# Patient Record
Sex: MALE | Race: WHITE | NOT HISPANIC OR LATINO | Employment: FULL TIME | URBAN - NONMETROPOLITAN AREA
[De-identification: names, ages, dates, MRNs, and addresses within clinical notes are randomized per-mention and may not be internally consistent; named-entity substitution may affect disease eponyms.]

---

## 2017-05-03 ENCOUNTER — OFFICE VISIT - GICH (OUTPATIENT)
Dept: FAMILY MEDICINE | Facility: OTHER | Age: 32
End: 2017-05-03

## 2017-05-03 ENCOUNTER — HISTORY (OUTPATIENT)
Dept: FAMILY MEDICINE | Facility: OTHER | Age: 32
End: 2017-05-03

## 2017-05-03 DIAGNOSIS — J06.9 ACUTE UPPER RESPIRATORY INFECTION: ICD-10-CM

## 2017-05-03 DIAGNOSIS — B97.89 OTHER VIRAL AGENTS AS THE CAUSE OF DISEASES CLASSIFIED ELSEWHERE: ICD-10-CM

## 2017-05-03 DIAGNOSIS — J02.9 ACUTE PHARYNGITIS: ICD-10-CM

## 2017-05-03 LAB — STREP A ANTIGEN - HISTORICAL: NEGATIVE

## 2017-05-26 ENCOUNTER — COMMUNICATION - GICH (OUTPATIENT)
Dept: FAMILY MEDICINE | Facility: OTHER | Age: 32
End: 2017-05-26

## 2017-05-26 DIAGNOSIS — J30.1 ALLERGIC RHINITIS DUE TO POLLEN: ICD-10-CM

## 2018-01-04 NOTE — PATIENT INSTRUCTIONS
Patient Information     Patient Name MRN Bennie Morris [id number] Male 1985      Patient Instructions by Daphne Mccoy NP at 5/3/2017  4:15 PM     Author:  Dahpne Mccoy NP Service:  (none) Author Type:  PHYS- Nurse Practitioner     Filed:  5/3/2017  5:10 PM Encounter Date:  5/3/2017 Status:  Signed     :  Daphne Mccoy NP (PHYS- Nurse Practitioner)            Cold Medicines   What are cold medicines?  Symptoms of the common cold start gradually over several days and usually last about two weeks. Symptoms may include sneezing, a stuffy or runny nose, sore throat, cough, watery eyes, mild headache, or body aches. A cold will go away on its own without treatment. However, there are many nonprescription products that may help relieve some of the symptoms of a cold. Cold medicines often contain more than one ingredient and are used to treat more than one symptom. Read the labels and buy products that have only the ingredients that you need. If you are not sure which medicine is best, ask your pharmacist.  How do they work?  Decongestants reduce swelling in your nose and sinuses. They may also lessen the amount of mucus made by your nose. If you use decongestants more often than directed, your stuffy nose may get worse.   Antihistamines block the effect of histamine. Histamine is a chemical your body makes when you have an allergic reaction. Antihistamines are most often used to treat itchy or watery eyes or a stuffy or runny nose caused by an allergy. Antihistamines may not help a stuffy or runny nose caused by a cold because they can make mucus thick and dry.  Mucolytics are medicines that make mucus thinner so that it is easier to cough up out of your throat and lungs.  Expectorants are cough medicines that may help to keep the mucus thin and bring up mucus from the lungs when you cough. This may relieve chest congestion and make it easier to breathe.   Cough suppressants  (antitussives) are medicines that lessen the urge to cough. They may give relief from a dry, hacking cough. If you have a cough that is wet sounding and produces mucus, it is important for you to cough the mucus up out of your lungs. For this reason, cough suppressants are not recommended for a wet sounding cough.  Fever and pain relievers, such as acetaminophen, aspirin, or other nonsteroidal anti-inflammatory drugs (NSAIDs), are often included in cold medicine. Read labels carefully to avoid taking more medicine than you need.  What else do I need to know about this medicine?    Talk to your healthcare provider if your symptoms start suddenly or you have severe symptoms. This may mean you have something more serious than a cold.    Follow the directions that come with your medicine, including information about food or alcohol. Make sure you know how and when to take your medicine. Do not take more or less than you are supposed to take.    Try to get all of your medicine at the same place. Your pharmacist can help make sure that all of your medicines are safe to take together.    Keep a list of your medicines with you. List all of the prescription medicines, nonprescription medicines, supplements, natural remedies, and vitamins that you take. Tell all healthcare providers who treat you about all of the products you are taking.    Many medicines have side effects. A side effect is a symptom or problem that is caused by the medicine. Ask your healthcare provider or pharmacist what side effects the medicine may cause and what you should do if you have side effects.    Nonsteroidal anti-inflammatory medicines (NSAIDs), such as ibuprofen, naproxen, and aspirin, may cause stomach bleeding and other problems. These risks increase with age. Read the label and take as directed. Unless recommended by your healthcare provider, do not take for more than 10 days for any reason.    Acetaminophen may cause liver damage or other  problems. Unless recommended by your provider, don't take more than 3000 milligrams (mg) in 24 hours. To make sure you don t take too much, check other medicines you take to see if they also contain acetaminophen. Ask your provider if you need to avoid drinking alcohol while taking this medicine.  If you have any questions, ask your healthcare provider or pharmacist for more information. Be sure to keep all appointments for provider visits or tests.      Symptoms likely due to virus. No antibiotic is needed at this time. Symptoms typically worse on days 2-5 and then stabilize and you are sick for days 5-12. Days 12-14 there is slow resolution and if there is a cough, studies show it can linger longer, however one is not as ill as in the beginning. If symptoms begin worsening or fail to improve after 14 days, return to clinic for reevaluation.

## 2018-01-04 NOTE — PROGRESS NOTES
Patient Information     Patient Name MRN Sex Bennie Rubio [id number] Male 1985      Progress Notes by Daphne Mccoy NP at 5/3/2017  4:15 PM     Author:  Daphne Mccoy NP Service:  (none) Author Type:  PHYS- Nurse Practitioner     Filed:  5/3/2017  5:29 PM Encounter Date:  5/3/2017 Status:  Signed     :  Daphne Mccoy NP (PHYS- Nurse Practitioner)            Nursing Notes:   Elle Nguyen  5/3/2017  5:08 PM  Signed  Patient presents to clinic with cough, right ear pain.  Elle NguyenLPN ....................  5/3/2017   4:45 PM      SUBJECTIVE:    Bennie Sullivan is a 32 y.o. male who presents for Cough and ear pain    URI    This is a new problem. Episode onset: 5 days. Maximum temperature: 99.0-99.9. The fever has been present for 1 to 2 days. Associated symptoms include congestion, coughing, ear pain, headaches, a plugged ear sensation, rhinorrhea, sneezing and a sore throat. Pertinent negatives include no sinus pain, swollen glands, vomiting or wheezing. He has tried decongestant for the symptoms. The treatment provided mild relief.       Current Outpatient Prescriptions on File Prior to Visit       Medication  Sig Dispense Refill     cetirizine (ZYRTEC) 10 mg tablet Take 1 tablet by mouth once daily. 30 tablet 11     dextroamphetamine-amphetamine (ADDERALL XR) 30 mg Extended-Release capsule Take 1 capsule by mouth once daily  Earliest Fill Date: 17 TAKE ONE CAPSULE BY MOUTH EVERY MORNING 30 capsule 0     triamcinolone, 55 mcg each actuation, nasal (NASACORT) 55 mcg nasal spray Inhale 2 Sprays into both nostrils once daily. 16.5 g 0     No current facility-administered medications on file prior to visit.        REVIEW OF SYSTEMS:  Review of Systems   HENT: Positive for congestion, ear pain, rhinorrhea, sneezing and sore throat.    Respiratory: Positive for cough. Negative for wheezing.    Gastrointestinal: Negative for vomiting.   Neurological: Positive for  "headaches.       OBJECTIVE:  /78  Pulse 64  Temp 99.8  F (37.7  C)  Ht 1.676 m (5' 6\")  Wt 78.8 kg (173 lb 12.8 oz)  BMI 28.05 kg/m2    EXAM:   Physical Exam   Constitutional: He is well-developed, well-nourished, and in no distress.   HENT:   Head: Normocephalic and atraumatic.   Right Ear: Ear canal normal. Tympanic membrane is injected. A middle ear effusion is present.   Left Ear: Ear canal normal. Tympanic membrane is injected. A middle ear effusion is present.   Nose: Rhinorrhea present. Right sinus exhibits no maxillary sinus tenderness and no frontal sinus tenderness. Left sinus exhibits no maxillary sinus tenderness and no frontal sinus tenderness.   Nasal congestion heard. Ears: Mild bilateral effusion seen, TMs injected, not bulging, clear fluid    Eyes: Conjunctivae are normal.   Neck: Neck supple.   Cardiovascular: Normal rate, regular rhythm and normal heart sounds.    Pulmonary/Chest: Effort normal and breath sounds normal. No respiratory distress. He has no wheezes. He has no rales.   Lymphadenopathy:     He has no cervical adenopathy.   Nursing note and vitals reviewed.    Results for orders placed or performed in visit on 05/03/17      RAPID STREP WITH REFLEX CULTURE      Result  Value Ref Range    STREP A ANTIGEN           Negative Negative       ASSESSMENT/PLAN:    ICD-10-CM    1. Sore throat J02.9 RAPID STREP WITH REFLEX CULTURE      RAPID STREP WITH REFLEX CULTURE   2. Viral upper respiratory tract infection J06.9      B97.89         Plan:  Completed labs at today's visit RST.  I personally reviewed the labs with the patient/parent at the visit. Abnormalities include None. Symptoms likely due to virus. No antibiotic is needed at this time. Symptoms typically worse on days 2-5 and then stabilize and you are sick for days 5-12. Days 12-14 there is slow resolution and if there is a cough, studies show it can linger longer, however one is not as ill as in the beginning. If symptoms begin " worsening or fail to improve after 14 days, return to clinic for reevaluation. All questions were answered and he is in agreement with plan.        KAYLYNN MACIAS NP ....................  5/3/2017   5:29 PM

## 2018-01-04 NOTE — NURSING NOTE
Patient Information     Patient Name MRN Sex Bennie Rubio [id number] Male 1985      Nursing Note by Elle Nguyen at 5/3/2017  4:15 PM     Author:  Elle Nguyen Service:  (none) Author Type:  (none)     Filed:  5/3/2017  5:08 PM Encounter Date:  5/3/2017 Status:  Signed     :  Elle Nguyen            Patient presents to clinic with cough, right ear pain.  Elle Anthony ....................  5/3/2017   4:45 PM

## 2018-01-05 NOTE — TELEPHONE ENCOUNTER
Patient Information     Patient Name MRN Sex Bennie Rubio [id number] Male 1985      Telephone Encounter by Fol Tovar RN at 2017 10:57 AM     Author:  Flo Tovar RN Service:  (none) Author Type:  NURS- Registered Nurse     Filed:  2017 11:01 AM Encounter Date:  2017 Status:  Signed     :  Flo Tovar RN (NURS- Registered Nurse)            Antihistamines:    Office visit in the past 12 months.    Last visit with JULIUS YATES was on: 2016 in [facility]  Next visit with JULIUS YATES is on: No future appointment listed with this provider  Next visit with Family Practice is on: No future appointment listed in this department    Max refills 12 months from last office visit.    Chart review shows that last office visit with PCP to address diagnosis of allergic rhinitis was on 16. Patient is due for annual medication management appointment with PCP. Will refill rx for a limited supply as well as send patient a reminder letter/MyChart message.    Prescription refilled per RN Medication Refill Policy.................... Flo Tovar RN ....................  2017   10:58 AM

## 2018-01-17 RX ORDER — CETIRIZINE HYDROCHLORIDE 10 MG/1
10 TABLET ORAL DAILY
COMMUNITY
Start: 2017-05-26 | End: 2018-03-23

## 2018-01-17 RX ORDER — DEXTROAMPHETAMINE SACCHARATE, AMPHETAMINE ASPARTATE MONOHYDRATE, DEXTROAMPHETAMINE SULFATE AND AMPHETAMINE SULFATE 7.5; 7.5; 7.5; 7.5 MG/1; MG/1; MG/1; MG/1
30 CAPSULE, EXTENDED RELEASE ORAL EVERY MORNING
COMMUNITY
Start: 2017-01-30

## 2018-01-17 RX ORDER — TRIAMCINOLONE ACETONIDE 55 UG/1
2 SPRAY, METERED NASAL DAILY
COMMUNITY
Start: 2016-05-06 | End: 2018-03-23

## 2018-01-25 VITALS
SYSTOLIC BLOOD PRESSURE: 128 MMHG | BODY MASS INDEX: 27.93 KG/M2 | TEMPERATURE: 99.8 F | HEART RATE: 64 BPM | DIASTOLIC BLOOD PRESSURE: 78 MMHG | WEIGHT: 173.8 LBS | HEIGHT: 66 IN

## 2018-03-23 ENCOUNTER — OFFICE VISIT (OUTPATIENT)
Dept: FAMILY MEDICINE | Facility: OTHER | Age: 33
End: 2018-03-23
Attending: FAMILY MEDICINE
Payer: COMMERCIAL

## 2018-03-23 VITALS
RESPIRATION RATE: 16 BRPM | HEIGHT: 67 IN | SYSTOLIC BLOOD PRESSURE: 118 MMHG | WEIGHT: 184.2 LBS | DIASTOLIC BLOOD PRESSURE: 70 MMHG | BODY MASS INDEX: 28.91 KG/M2

## 2018-03-23 DIAGNOSIS — J30.89 CHRONIC NONSEASONAL ALLERGIC RHINITIS DUE TO POLLEN: Primary | ICD-10-CM

## 2018-03-23 PROCEDURE — 99213 OFFICE O/P EST LOW 20 MIN: CPT | Performed by: FAMILY MEDICINE

## 2018-03-23 PROCEDURE — G0463 HOSPITAL OUTPT CLINIC VISIT: HCPCS

## 2018-03-23 RX ORDER — TRIAMCINOLONE ACETONIDE 55 UG/1
2 SPRAY, METERED NASAL DAILY
Qty: 10.9 ML | Refills: 11 | Status: SHIPPED | OUTPATIENT
Start: 2018-03-23 | End: 2020-05-22

## 2018-03-23 RX ORDER — CETIRIZINE HYDROCHLORIDE 10 MG/1
10 TABLET ORAL DAILY
Qty: 90 TABLET | Refills: 3 | Status: SHIPPED | OUTPATIENT
Start: 2018-03-23 | End: 2020-05-22

## 2018-03-23 ASSESSMENT — PAIN SCALES - GENERAL: PAINLEVEL: NO PAIN (0)

## 2018-03-23 ASSESSMENT — ENCOUNTER SYMPTOMS
WHEEZING: 0
COUGH: 0
SINUS PRESSURE: 1

## 2018-03-23 ASSESSMENT — ANXIETY QUESTIONNAIRES
1. FEELING NERVOUS, ANXIOUS, OR ON EDGE: NOT AT ALL
7. FEELING AFRAID AS IF SOMETHING AWFUL MIGHT HAPPEN: NOT AT ALL
GAD7 TOTAL SCORE: 0
2. NOT BEING ABLE TO STOP OR CONTROL WORRYING: NOT AT ALL
3. WORRYING TOO MUCH ABOUT DIFFERENT THINGS: NOT AT ALL
5. BEING SO RESTLESS THAT IT IS HARD TO SIT STILL: NOT AT ALL
IF YOU CHECKED OFF ANY PROBLEMS ON THIS QUESTIONNAIRE, HOW DIFFICULT HAVE THESE PROBLEMS MADE IT FOR YOU TO DO YOUR WORK, TAKE CARE OF THINGS AT HOME, OR GET ALONG WITH OTHER PEOPLE: NOT DIFFICULT AT ALL
6. BECOMING EASILY ANNOYED OR IRRITABLE: NOT AT ALL

## 2018-03-23 ASSESSMENT — PATIENT HEALTH QUESTIONNAIRE - PHQ9: 5. POOR APPETITE OR OVEREATING: NOT AT ALL

## 2018-03-23 NOTE — PROGRESS NOTES
"  SUBJECTIVE:   Bennie Sullivan is a 32 year old male who presents to clinic today for the following health issues:    HPI    Here for a refill on his allergy meds.  Is on nasacort and zyrtec.  Has been on them for about 3 years or more.  Works well.  Has had symptoms since a child.  Tried allegra and claritin, not as effective.  As a child was tested and positive \"for basically anything that grows\".  Is a forester.     Patient Active Problem List    Diagnosis Date Noted     Chronic nonseasonal allergic rhinitis due to pollen 03/23/2018     Priority: Medium     Attention deficit hyperactivity disorder (ADHD), predominantly inattentive type 04/24/2015     Priority: Medium     Overview:   Trial of Concerta at 34, then 54 mg. Not effective.  Adderall XR 30 mg working well.        Attention and concentration deficit 11/05/2014     Priority: Medium     Past Surgical History:   Procedure Laterality Date     OTHER SURGICAL HISTORY      147269,OTHER,Age 20     Social History     Social History Narrative    Mother Katie Sullivan  Father Jose Antonio Sullivan    Daughter 2010  Teodora.    Stopped smoking 2013.    He works for the [employer] and goes to college at [employer] then plans to transfer to [employer]     Current Outpatient Prescriptions   Medication Sig Dispense Refill     cetirizine (ZYRTEC) 10 MG tablet Take 1 tablet (10 mg) by mouth daily 90 tablet 3     triamcinolone (NASACORT) 55 MCG/ACT Inhaler Spray 2 sprays in nostril daily 10.9 mL 11     amphetamine-dextroamphetamine (ADDERALL XR) 30 MG per 24 hr capsule Take 30 mg by mouth every morning       Allergies   Allergen Reactions     Sulfamethoxazole-Trimethoprim Unknown       Review of Systems   HENT: Positive for congestion and sinus pressure.    Respiratory: Negative for cough and wheezing.    Skin: Negative for rash.        OBJECTIVE:     /70 (BP Location: Right arm, Patient Position: Sitting, Cuff Size: Adult Large)  Resp 16  Ht 5' 7\" (1.702 m)  Wt 184 lb " 3.2 oz (83.6 kg)  BMI 28.85 kg/m2  Body mass index is 28.85 kg/(m^2).  Physical Exam   Constitutional: He is oriented to person, place, and time. He appears well-developed. No distress.   HENT:   Significant nasal edema with clear rhinorrhea   Pulmonary/Chest: Effort normal. No respiratory distress. He has no wheezes. He has no rales.   Neurological: He is alert and oriented to person, place, and time.   Skin: He is not diaphoretic.   Psychiatric: He has a normal mood and affect. His behavior is normal. Thought content normal.       Diagnostic Test Results:  none     ASSESSMENT/PLAN:         (J30.89) Chronic nonseasonal allergic rhinitis due to pollen  (primary encounter diagnosis)  Comment: stable  Plan: cetirizine (ZYRTEC) 10 MG tablet, triamcinolone        (NASACORT) 55 MCG/ACT Inhaler        Refilled his meds, follow up as needed.  Discussed with him that they are both now available OTC, but with a prescription, sometimes will be covered by insurance.        Chuck Velazquez MD  [facility]

## 2018-03-23 NOTE — MR AVS SNAPSHOT
"              After Visit Summary   3/23/2018    Bennie Sullivan    MRN: [id number]           Patient Information     Date Of Birth          1985        Visit Information        Provider Department      3/23/2018 10:30 AM Chuck Velazquez MD [facility]        Today's Diagnoses     Chronic nonseasonal allergic rhinitis due to pollen    -  1       Follow-ups after your visit        Who to contact     If you have questions or need follow up information about today's clinic visit or your schedule please contact [facility] directly at [phone].  Normal or non-critical lab and imaging results will be communicated to you by [employer], letter or phone within 4 business days after the clinic has received the results. If you do not hear from us within 7 days, please contact the clinic through [employer] or phone. If you have a critical or abnormal lab result, we will notify you by phone as soon as possible.  Submit refill requests through [employer] or call your pharmacy and they will forward the refill request to us. Please allow 3 business days for your refill to be completed.          Additional Information About Your Visit        MyChart Information     [employer] lets you send messages to your doctor, view your test results, renew your prescriptions, schedule appointments and more. To sign up, go to [email or web address] . Click on \"Log in\" on the left side of the screen, which will take you to the Welcome page. Then click on \"Sign up Now\" on the right side of the page.     You will be asked to enter the access code listed below, as well as some personal information. Please follow the directions to create your username and password.     Your access code is: [email or web address]  Expires: 2018 11:05 AM     Your access code will  in 90 days. If you need help or a new code, please call your [facility] clinic or [phone].        Care EveryWhere ID     This is your Care " "EveryWhere ID. This could be used by other organizations to access your [facility] medical records  [id number]        Your Vitals Were     Respirations Height BMI (Body Mass Index)             16 5' 7\" (1.702 m) 28.85 kg/m2          Blood Pressure from Last 3 Encounters:   03/23/18 118/70   05/03/17 128/78   12/01/16 120/78    Weight from Last 3 Encounters:   03/23/18 184 lb 3.2 oz (83.6 kg)   05/03/17 173 lb 12.8 oz (78.8 kg)   12/01/16 163 lb (73.9 kg)              Today, you had the following     No orders found for display         Where to get your medicines      These medications were sent to [facility] [address] [address]     Phone:  [phone]     cetirizine 10 MG tablet    triamcinolone 55 MCG/ACT Inhaler          Primary Care Provider Office Phone # Fax #    Mario COTTO MD Domingo [phone]       [address] [facility] [address]        Equal Access to Services     [facility]: Hadii aad ku hadasho Sostaciali, waaxda luqadaha, qaybta kaalmada adeegyada, nithin lamb . So [facility] [phone].    ATENCIÓN: Si habla español, tiene a dennis disposición servicios gratuitos de asistencia lingüística. Llame al [phone].    We comply with applicable federal civil rights laws and [place] laws. We do not discriminate on the basis of race, color, national origin, age, disability, sex, sexual orientation, or gender identity.            Thank you!     Thank you for choosing [facility] AND [facility]  for your care. Our goal is always to provide you with excellent care. Hearing back from our patients is one way we can continue to improve our services. Please take a few minutes to complete the written survey that you may receive in the mail after your visit with us. Thank you!             Your Updated Medication List - Protect others around you: Learn how to safely use, store and " throw away your medicines at www.disposemymeds.org.          This list is accurate as of 3/23/18 11:05 AM.  Always use your most recent med list.                   Brand Name Dispense Instructions for use Diagnosis    amphetamine-dextroamphetamine 30 MG per 24 hr capsule    ADDERALL XR     Take 30 mg by mouth every morning        cetirizine 10 MG tablet    zyrTEC    90 tablet    Take 1 tablet (10 mg) by mouth daily    Chronic nonseasonal allergic rhinitis due to pollen       triamcinolone 55 MCG/ACT Inhaler    NASACORT    10.9 mL    Spray 2 sprays in nostril daily    Chronic nonseasonal allergic rhinitis due to pollen

## 2018-03-24 ASSESSMENT — ANXIETY QUESTIONNAIRES: GAD7 TOTAL SCORE: 0

## 2018-07-23 NOTE — PROGRESS NOTES
Patient Information     Patient Name  Bennie Sullivan MRN  [id number] Sex  Male   1985      Letter by Reynaldo Kiran MD at      Author:  Reynaldo Kiran MD Service:  (none) Author Type:  (none)    Filed:   Encounter Date:  2017 Status:  (Other)           Bennie Sullivan  [address]  [place] [facility] [address]          May 26, 2017    Dear Mr. Sullivan:    This is to remind you that you are due for your annual medication management appointment with Mario Lane MD in relation to continued use of zyrtec.  Your last visit was on 16. Additional refills of your medication require you to complete this visit.    Please call [phone] to schedule your appointment.    Thank you for choosing [facility] for your health care needs.    Sincerely,      Refill RN  [facility]

## 2020-05-22 DIAGNOSIS — J30.89 CHRONIC NONSEASONAL ALLERGIC RHINITIS DUE TO POLLEN: ICD-10-CM

## 2020-05-22 RX ORDER — CETIRIZINE HYDROCHLORIDE 10 MG/1
TABLET ORAL
Qty: 30 TABLET | Refills: 0 | Status: SHIPPED | OUTPATIENT
Start: 2020-05-22 | End: 2023-02-03

## 2020-05-22 RX ORDER — TRIAMCINOLONE ACETONIDE 55 UG/1
SPRAY, METERED NASAL
Qty: 10.9 ML | Refills: 0 | Status: SHIPPED | OUTPATIENT
Start: 2020-05-22 | End: 2023-02-03

## 2020-05-22 NOTE — LETTER
May 22, 2020      Bennie Sullivan  [address] [facility] [address]        Dear Bennie,           This letter is to remind you that you are due for your annual exam with Mario Lane. Your last comprehensive visit was more than 12 months ago.    A refill request for your triamcinolone inhaler and cetirizine has been sent to a provider for review. Additional refills require you to complete this appointment.    Please call the clinic at [phone] to schedule your appointment.    If you should require additional refills before your scheduled appointment, please contact your pharmacy and we will refill your medication until the date of your appointment.    If you are no longer seeing Mario Lane for primary care, please call to let us know.       Thank you for choosing [facility] and [facility] for your health care needs.    Sincerely,    Refill RN  [facility]

## 2020-05-22 NOTE — TELEPHONE ENCOUNTER
" Disp  Refills  Start  End  LIANE    triamcinolone (NASACORT) 55 MCG/ACT Inhaler  10.9 mL  11  3/23/2018   No    Sig - Route: Spray 2 sprays in nostril daily - Nasal       Disp  Refills  Start  End  LIANE    cetirizine (ZYRTEC) 10 MG tablet  90 tablet  3  3/23/2018   No    Sig - Route: Take 1 tablet (10 mg) by mouth daily - Oral        LOV: 3/23/2018  Future Office visit: No future appointment scheduled a this time. Annual reminder letter sent to patient via mail.     T'd up limited refill and routing refill request to provider for review/approval because:  Failed protocol    Requested Prescriptions   Pending Prescriptions Disp Refills     cetirizine (ZYRTEC) 10 MG tablet [Pharmacy Med Name: Cetirizine HCl 10 MG Oral Tablet] 30 tablet 0     Sig: Take 1 tablet by mouth once daily       Antihistamines Protocol Failed - 5/22/2020 11:16 AM        Failed - Recent (12 mo) or future (30 days) visit within the authorizing provider's specialty     Patient has had an office visit with the authorizing provider or a provider within the authorizing providers department within the previous 12 mos or has a future within next 30 days. See \"Patient Info\" tab in inbasket, or \"Choose Columns\" in Meds & Orders section of the refill encounter.              Passed - Patient is 3-64 years of age     Apply weight-based dosing for peds patients age 3 - 12 years of age.    Forward request to provider for patients under the age of 3 or over the age of 64.          Passed - Medication is active on med list           triamcinolone (NASACORT) 55 MCG/ACT nasal aerosol [Pharmacy Med Name: Triamcinolone Acetonide 55 MCG/ACT Nasal Aerosol]  0     Sig: Use 2 spray(s) in each nostril once daily       Nasal Allergy Protocol Failed - 5/22/2020 11:16 AM        Failed - Recent (12 mo) or future (30 days) visit within the authorizing provider's specialty     Patient has had an office visit with the authorizing provider or a provider within the authorizing " "providers department within the previous 12 mos or has a future within next 30 days. See \"Patient Info\" tab in inbasket, or \"Choose Columns\" in Meds & Orders section of the refill encounter.              Passed - Patient is age 12 or older        Passed - Medication is active on med list         Unable to complete prescription refill per RN Medication Refill Policy.................... Lillie Gutierrez RN ....................  5/22/2020   2:21 PM        "

## 2020-06-01 ENCOUNTER — HOSPITAL ENCOUNTER (OUTPATIENT)
Dept: GENERAL RADIOLOGY | Facility: OTHER | Age: 35
Discharge: HOME | End: 2020-06-01
Attending: UROLOGY | Admitting: FAMILY MEDICINE
Payer: COMMERCIAL

## 2020-06-01 DIAGNOSIS — M54.50 LOW BACK PAIN: ICD-10-CM

## 2020-06-01 PROCEDURE — 25500064 ZZH RX 255 OP 636: Performed by: RADIOLOGY

## 2020-06-01 PROCEDURE — 25000125 ZZHC RX 250: Performed by: RADIOLOGY

## 2020-06-01 PROCEDURE — 62323 NJX INTERLAMINAR LMBR/SAC: CPT

## 2020-06-01 PROCEDURE — 25000128 H RX IP 250 OP 636: Performed by: RADIOLOGY

## 2020-06-01 RX ORDER — LIDOCAINE HYDROCHLORIDE 10 MG/ML
2 INJECTION, SOLUTION INFILTRATION; PERINEURAL ONCE
Status: COMPLETED | OUTPATIENT
Start: 2020-06-01 | End: 2020-06-01

## 2020-06-01 RX ORDER — METHYLPREDNISOLONE ACETATE 80 MG/ML
80 INJECTION, SUSPENSION INTRA-ARTICULAR; INTRALESIONAL; INTRAMUSCULAR; SOFT TISSUE ONCE
Status: COMPLETED | OUTPATIENT
Start: 2020-06-01 | End: 2020-06-01

## 2020-06-01 RX ORDER — LIDOCAINE HYDROCHLORIDE 10 MG/ML
2 INJECTION, SOLUTION EPIDURAL; INFILTRATION; INTRACAUDAL; PERINEURAL ONCE
Status: COMPLETED | OUTPATIENT
Start: 2020-06-01 | End: 2020-06-01

## 2020-06-01 RX ADMIN — LIDOCAINE HYDROCHLORIDE 2 ML: 10 INJECTION, SOLUTION INFILTRATION; PERINEURAL at 11:24

## 2020-06-01 RX ADMIN — METHYLPREDNISOLONE ACETATE 80 MG: 80 INJECTION, SUSPENSION INTRA-ARTICULAR; INTRALESIONAL; INTRAMUSCULAR; SOFT TISSUE at 11:24

## 2020-06-01 RX ADMIN — LIDOCAINE HYDROCHLORIDE 2 ML: 10 INJECTION, SOLUTION EPIDURAL; INFILTRATION; INTRACAUDAL; PERINEURAL at 11:24

## 2020-06-01 RX ADMIN — IOHEXOL 2 ML: 240 INJECTION, SOLUTION INTRATHECAL; INTRAVASCULAR; INTRAVENOUS; ORAL at 11:23

## 2020-08-17 ENCOUNTER — HOSPITAL ENCOUNTER (OUTPATIENT)
Dept: GENERAL RADIOLOGY | Facility: OTHER | Age: 35
Discharge: HOME | End: 2020-08-17
Attending: FAMILY MEDICINE | Admitting: FAMILY MEDICINE
Payer: COMMERCIAL

## 2020-08-17 DIAGNOSIS — M54.50 LOW BACK PAIN: ICD-10-CM

## 2020-08-17 PROCEDURE — 62323 NJX INTERLAMINAR LMBR/SAC: CPT

## 2020-08-17 PROCEDURE — 25500064 ZZH RX 255 OP 636: Performed by: RADIOLOGY

## 2020-08-17 PROCEDURE — 25000128 H RX IP 250 OP 636: Performed by: RADIOLOGY

## 2020-08-17 PROCEDURE — 25000125 ZZHC RX 250: Performed by: RADIOLOGY

## 2020-08-17 RX ORDER — LIDOCAINE HYDROCHLORIDE 10 MG/ML
2 INJECTION, SOLUTION EPIDURAL; INFILTRATION; INTRACAUDAL; PERINEURAL ONCE
Status: COMPLETED | OUTPATIENT
Start: 2020-08-17 | End: 2020-08-17

## 2020-08-17 RX ORDER — METHYLPREDNISOLONE ACETATE 80 MG/ML
80 INJECTION, SUSPENSION INTRA-ARTICULAR; INTRALESIONAL; INTRAMUSCULAR; SOFT TISSUE ONCE
Status: COMPLETED | OUTPATIENT
Start: 2020-08-17 | End: 2020-08-17

## 2020-08-17 RX ADMIN — LIDOCAINE HYDROCHLORIDE 2 ML: 10 INJECTION, SOLUTION EPIDURAL; INFILTRATION; INTRACAUDAL; PERINEURAL at 13:07

## 2020-08-17 RX ADMIN — METHYLPREDNISOLONE ACETATE 80 MG: 80 INJECTION, SUSPENSION INTRA-ARTICULAR; INTRALESIONAL; INTRAMUSCULAR; SOFT TISSUE at 13:07

## 2020-08-17 RX ADMIN — IOHEXOL 2 ML: 240 INJECTION, SOLUTION INTRATHECAL; INTRAVASCULAR; INTRAVENOUS; ORAL at 13:07

## 2020-08-17 RX ADMIN — LIDOCAINE HYDROCHLORIDE 2 ML: 10 INJECTION, SOLUTION INFILTRATION; PERINEURAL at 13:07

## 2022-12-07 ENCOUNTER — ALLIED HEALTH/NURSE VISIT (OUTPATIENT)
Dept: FAMILY MEDICINE | Facility: OTHER | Age: 37
End: 2022-12-07
Payer: COMMERCIAL

## 2022-12-07 ENCOUNTER — NURSE TRIAGE (OUTPATIENT)
Dept: FAMILY MEDICINE | Facility: OTHER | Age: 37
End: 2022-12-07

## 2022-12-07 DIAGNOSIS — Z20.822 EXPOSURE TO 2019 NOVEL CORONAVIRUS: Primary | ICD-10-CM

## 2022-12-07 DIAGNOSIS — Z20.822 EXPOSURE TO 2019 NOVEL CORONAVIRUS: ICD-10-CM

## 2022-12-07 LAB — SARS-COV-2 RNA RESP QL NAA+PROBE: NEGATIVE

## 2022-12-07 PROCEDURE — U0003 INFECTIOUS AGENT DETECTION BY NUCLEIC ACID (DNA OR RNA); SEVERE ACUTE RESPIRATORY SYNDROME CORONAVIRUS 2 (SARS-COV-2) (CORONAVIRUS DISEASE [COVID-19]), AMPLIFIED PROBE TECHNIQUE, MAKING USE OF HIGH THROUGHPUT TECHNOLOGIES AS DESCRIBED BY CMS-2020-01-R: HCPCS

## 2022-12-07 PROCEDURE — U0005 INFEC AGEN DETEC AMPLI PROBE: HCPCS

## 2022-12-07 NOTE — TELEPHONE ENCOUNTER
"Pt calling and was exposed to Covid yesterday. Gave care advice. He would like to tested today.No s/s. Scheduled for covid test.    Bina Figueroa RN    Reason for Disposition    [1] CLOSE CONTACT COVID-19 EXPOSURE within last 14 days AND [2] needs COVID-19 lab test to return to work AND [3] NO symptoms    Additional Information    Negative: COVID-19 lab test positive    Negative: [1] Lives with someone known to have influenza (flu test positive) AND [2] flu-like symptoms (e.g., cough, runny nose, sore throat, SOB; with or without fever)    Negative: [1] Symptoms of COVID-19 (e.g., cough, fever, SOB, or others) AND [2] doctor (or NP/PA) diagnosed COVID-19 based on symptoms    Negative: [1] Symptoms of COVID-19 (e.g., cough, fever, SOB, or others) AND [2] lives in an area with community spread    Negative: [1] Symptoms of COVID-19 (e.g., cough, fever, SOB, or others) AND [2] within 14 days of EXPOSURE (close contact) with diagnosed or suspected COVID-19 patient    Negative: [1] Symptoms of COVID-19 (e.g., cough, fever, SOB, or others) AND [2] within 14 days of travel from high-risk area for COVID-19 community spread (identified by CDC)    Negative: [1] Difficulty breathing (shortness of breath) occurs AND [2] onset > 14 days after COVID-19 EXPOSURE (Close Contact)    Negative: [1] Dry cough occurs AND [2] onset > 14 days after COVID-19 EXPOSURE    Negative: [1] Wet cough (i.e., white-yellow, yellow, green, or juan colored sputum) AND [2] onset > 14 days after COVID-19 EXPOSURE    Negative: [1] Common cold symptoms AND [2] onset > 14 days after COVID-19 EXPOSURE    Negative: COVID-19 vaccine reaction suspected (e.g., fever, headache, muscle aches) occurring during days 1 to 3 after getting vaccine    Negative: COVID-19 vaccine, questions about    Answer Assessment - Initial Assessment Questions  1. COVID-19 EXPOSURE: \"Please describe how you were exposed to someone with a COVID-19 infection.\"      yesterday  2. PLACE " "of CONTACT: \"Where were you when you were exposed to COVID-19?\" (e.g., home, school, medical waiting room; which city?)      [place]  3. TYPE of CONTACT: \"How much contact was there?\" (e.g., sitting next to, live in same house, work in same office, same building)      Sitting next to  4. DURATION of CONTACT: \"How long were you in contact with the COVID-19 patient?\" (e.g., a few seconds, passed by person, a few minutes, 15 minutes or longer, live with the patient)      yes  5. MASK: \"Were you wearing a mask?\" \"Was the other person wearing a mask?\" Note: wearing a mask reduces the risk of an otherwise close contact.      no  6. DATE of CONTACT: \"When did you have contact with a COVID-19 patient?\" (e.g., how many days ago)      12.6.2022  7. COMMUNITY SPREAD: \"Are there lots of cases of COVID-19 (community spread) where you live?\" (See public health department website, if unsure)        yes  8. SYMPTOMS: \"Do you have any symptoms?\" (e.g., fever, cough, breathing difficulty, loss of taste or smell)      *No Answer*  9. VACCINE: \"Have you gotten the COVID-19 vaccine?\" If Yes, ask: \"Which one, how many shots, when did you get it?\"      First two Moderna  10. BOOSTER: \"Have you received your COVID-19 booster?\" If Yes, ask: \"Which one and when did you get it?\"        no  11. PREGNANCY OR POSTPARTUM: \"Is there any chance you are pregnant?\" \"When was your last menstrual period?\" \"Did you deliver in the last 2 weeks?\"        na  12. HIGH RISK: \"Do you have any heart or lung problems?\" (e.g., asthma , COPD, heart failure) \"Do you have a weak immune system or other risk factors?\" (e.g., HIV positive, chemotherapy, renal failure, diabetes mellitus, sickle cell anemia, obesity)        no  13. TRAVEL: \"Have you traveled out of the country recently?\" If Yes, ask: \"When and where?\"  Note: Travel becomes less relevant if there is widespread community transmission where the patient lives.        *No Answer*    Protocols used: CORONAVIRUS " (COVID-19) EXPOSURE-A-AH 1.18.2022

## 2023-02-03 ENCOUNTER — OFFICE VISIT (OUTPATIENT)
Dept: FAMILY MEDICINE | Facility: OTHER | Age: 38
End: 2023-02-03
Payer: COMMERCIAL

## 2023-02-03 VITALS
WEIGHT: 205.2 LBS | SYSTOLIC BLOOD PRESSURE: 136 MMHG | RESPIRATION RATE: 18 BRPM | OXYGEN SATURATION: 96 % | HEART RATE: 88 BPM | BODY MASS INDEX: 32.21 KG/M2 | TEMPERATURE: 98.2 F | DIASTOLIC BLOOD PRESSURE: 84 MMHG | HEIGHT: 67 IN

## 2023-02-03 DIAGNOSIS — K92.1: Primary | ICD-10-CM

## 2023-02-03 LAB
BASOPHILS # BLD AUTO: 0.1 10E3/UL (ref 0–0.2)
BASOPHILS NFR BLD AUTO: 1 %
EOSINOPHIL # BLD AUTO: 0.1 10E3/UL (ref 0–0.7)
EOSINOPHIL NFR BLD AUTO: 2 %
ERYTHROCYTE [DISTWIDTH] IN BLOOD BY AUTOMATED COUNT: 12.6 % (ref 10–15)
HCT VFR BLD AUTO: 43.9 % (ref 40–53)
HGB BLD-MCNC: 16.1 G/DL (ref 13.3–17.7)
HOLD SPECIMEN: NORMAL
IMM GRANULOCYTES # BLD: 0.1 10E3/UL
IMM GRANULOCYTES NFR BLD: 1 %
LYMPHOCYTES # BLD AUTO: 2.4 10E3/UL (ref 0.8–5.3)
LYMPHOCYTES NFR BLD AUTO: 31 %
MCH RBC QN AUTO: 31.4 PG (ref 26.5–33)
MCHC RBC AUTO-ENTMCNC: 36.7 G/DL (ref 31.5–36.5)
MCV RBC AUTO: 86 FL (ref 78–100)
MONOCYTES # BLD AUTO: 0.8 10E3/UL (ref 0–1.3)
MONOCYTES NFR BLD AUTO: 10 %
NEUTROPHILS # BLD AUTO: 4.3 10E3/UL (ref 1.6–8.3)
NEUTROPHILS NFR BLD AUTO: 55 %
NRBC # BLD AUTO: 0 10E3/UL
NRBC BLD AUTO-RTO: 0 /100
PLATELET # BLD AUTO: 250 10E3/UL (ref 150–450)
RBC # BLD AUTO: 5.12 10E6/UL (ref 4.4–5.9)
WBC # BLD AUTO: 7.7 10E3/UL (ref 4–11)

## 2023-02-03 PROCEDURE — 36415 COLL VENOUS BLD VENIPUNCTURE: CPT | Mod: ZL

## 2023-02-03 PROCEDURE — 99213 OFFICE O/P EST LOW 20 MIN: CPT

## 2023-02-03 PROCEDURE — 87506 IADNA-DNA/RNA PROBE TQ 6-11: CPT | Mod: ZL

## 2023-02-03 PROCEDURE — 85018 HEMOGLOBIN: CPT | Mod: ZL

## 2023-02-03 RX ORDER — GABAPENTIN 300 MG/1
CAPSULE ORAL
COMMUNITY
Start: 2022-11-03

## 2023-02-03 RX ORDER — INDOMETHACIN 50 MG/1
CAPSULE ORAL
COMMUNITY
Start: 2022-11-30

## 2023-02-03 ASSESSMENT — PAIN SCALES - GENERAL: PAINLEVEL: NO PAIN (0)

## 2023-02-03 NOTE — NURSING NOTE
"Chief Complaint   Patient presents with     Diarrhea     Ongoing for 6 days     Rectal Problem     Started yesterday         Initial /84 (BP Location: Left arm, Patient Position: Sitting, Cuff Size: Adult Large)   Pulse 112   Temp 98.2  F (36.8  C) (Temporal)   Resp 18   Ht 1.702 m (5' 7\")   Wt 93.1 kg (205 lb 3.2 oz)   SpO2 96%   BMI 32.14 kg/m   Estimated body mass index is 32.14 kg/m  as calculated from the following:    Height as of this encounter: 1.702 m (5' 7\").    Weight as of this encounter: 93.1 kg (205 lb 3.2 oz).       Medication Reconciliation: Complete    Carol Begum LPN .......  2/3/2023  3:25 PM   "

## 2023-02-03 NOTE — PROGRESS NOTES
ASSESSMENT/PLAN:    (K92.1) Blood in stool, amrit  (primary encounter diagnosis)  Comment: Bright red blood in stool, small amount x 4 episodes over the past day. Rectal exam unremarkable, no fissures or hemorrhoids. Did get a CBC with slightly elevated HR. Patient does note anxiety about testing today. HR did come down at end of visit. CBC does not indicate anemia. Stool culture is pending.   I discussed this case with primary care MD who advises stool samples be sent with close follow up in primary care to determine if colonoscopy is needed.   Plan: Enteric Bacteria and Virus Panel by JACKIE Stool  We will call you with results of stool study.   If bleeding persists and stool culture is negative I do recommend follow up with primary care next week to discuss further testing/studies that may be needed.     Discussed warning signs/symptoms indicative of need to f/u    Follow up if symptoms persist or worsen or concerns    I have reviewed the nursing notes.  I have reviewed the findings, diagnosis, plan and need for follow up with the patient.    I explained my diagnostic considerations and recommendations to the patient, who voiced understanding and agreement with the treatment plan. All questions were answered. We discussed potential side effects of any prescribed or recommended therapies, as well as expectations for response to treatments.    MARVIN TORRES, DEREK CNP  2/3/2023  3:15 PM    HPI:    Bennie Sullivan is a 37 year old male  who presents to [facility] today for concerns of blood in stool. Patient reports about 6 days of diarrhea due to a GI bug. He felt better for about 24 hours. He noticed yesterday a very small amount of blood with some mucous with the stool. He has since stooled 4 times and reports a very small amount of stool and small amount of blood. He is not taking any medication for diarrhea. Denies abdominal pain.     This has been ongoing for 1 day.   Spots of blood on toilet paper after  wiping? Yes  Anal pain during or after defecation? Yes, he feels from wiping so much.   History of HSV? Yes, but no recent outbreak.   Fever, chills, night sweats, or recent weight loss? No  Accompanying diarrhea? Yes  Change in frequency or calliber of stools? Yes, related to the GI bug  History of colon cancer or polyps? No  Family history of colon cancer or polyps? No  NSAID use? No. No dark tarry stools.     PCP: [place].     Allergies: Bactrim      Past Medical History:   Diagnosis Date     Other fracture of unspecified lower leg, initial encounter for closed fracture     age 20     Past Surgical History:   Procedure Laterality Date     OTHER SURGICAL HISTORY      [id number],OTHER,Age 20     Social History     Tobacco Use     Smoking status: Former     Types: Cigarettes     Quit date: 2013     Years since quittin.2     Smokeless tobacco: Never   Substance Use Topics     Alcohol use: Yes     Comment: Alcoholic Drinks/day: occasionally     Current Outpatient Medications   Medication Sig Dispense Refill     amphetamine-dextroamphetamine (ADDERALL XR) 30 MG per 24 hr capsule Take 30 mg by mouth every morning       cetirizine (ZYRTEC) 10 MG tablet Take 1 tablet by mouth once daily 30 tablet 0     triamcinolone (NASACORT) 55 MCG/ACT nasal aerosol Use 2 spray(s) in each nostril once daily 10.9 mL 0     Allergies   Allergen Reactions     Sulfamethoxazole-Trimethoprim Unknown     Past medical history, past surgical history, current medications and allergies reviewed and accurate to the best of my knowledge.      ROS:  Refer to HPI    There were no vitals taken for this visit.    EXAM:  General Appearance: Well appearing 37 year old male, appropriate appearance for age. No acute distress   Eyes: conjunctivae normal without erythema or irritation, corneas clear, no drainage or crusting, no eyelid swelling, pupils equal   Respiratory: normal chest wall and respirations.  Normal effort.  Clear to auscultation  bilaterally, no wheezing, crackles or rhonchi.  No increased work of breathing.  No cough appreciated.  Cardiac: RRR with no murmurs  Abdomen: soft, nontender, no rigidity, no rebound tenderness or guarding, normal bowel sounds present  Rectal: External exam with no lesions, hemorrhoids, or anal fissures. Normal rectal sphincter tone. No internal hemorrhoids palpable. No blood or stool on exam finger.   Musculoskeletal:  Equal movement of bilateral upper extremities.  Equal movement of bilateral lower extremities.  Normal gait.    Dermatological: no rashes noted of exposed skin  Neuro: Alert and oriented to person, place, and time.  Cranial nerves II-XII grossly intact with no focal or lateralizing deficits.  Muscle tone normal.  Gait normal. No tremor.   Psychological: normal affect, alert, oriented, and pleasant.     Labs:  Results for orders placed or performed in visit on 02/03/23   CBC with platelets and differential     Status: Abnormal   Result Value Ref Range    WBC Count 7.7 4.0 - 11.0 10e3/uL    RBC Count 5.12 4.40 - 5.90 10e6/uL    Hemoglobin 16.1 13.3 - 17.7 g/dL    Hematocrit 43.9 40.0 - 53.0 %    MCV 86 78 - 100 fL    MCH 31.4 26.5 - 33.0 pg    MCHC 36.7 (H) 31.5 - 36.5 g/dL    RDW 12.6 10.0 - 15.0 %    Platelet Count 250 150 - 450 10e3/uL    % Neutrophils 55 %    % Lymphocytes 31 %    % Monocytes 10 %    % Eosinophils 2 %    % Basophils 1 %    % Immature Granulocytes 1 %    NRBCs per 100 WBC 0 <1 /100    Absolute Neutrophils 4.3 1.6 - 8.3 10e3/uL    Absolute Lymphocytes 2.4 0.8 - 5.3 10e3/uL    Absolute Monocytes 0.8 0.0 - 1.3 10e3/uL    Absolute Eosinophils 0.1 0.0 - 0.7 10e3/uL    Absolute Basophils 0.1 0.0 - 0.2 10e3/uL    Absolute Immature Granulocytes 0.1 <=0.4 10e3/uL    Absolute NRBCs 0.0 10e3/uL   Extra Tube     Status: None    Narrative    The following orders were created for panel order Extra Tube.  Procedure                               Abnormality         Status                      ---------                               -----------         ------                     Extra Serum Separator Tu...[[id number]]                      Final result                 Please view results for these tests on the individual orders.   Extra Serum Separator Tube (SST)     Status: None   Result Value Ref Range    Hold Specimen Hold    CBC and Differential     Status: Abnormal    Narrative    The following orders were created for panel order CBC and Differential.  Procedure                               Abnormality         Status                     ---------                               -----------         ------                     CBC with platelets and d...[[id number]]  Abnormal            Final result                 Please view results for these tests on the individual orders.

## 2023-02-04 NOTE — PATIENT INSTRUCTIONS
We will call you with results of stool study.   If bleeding persists and stool culture is negative I do recommend follow up with primary care next week to discuss further testing/studies that may be needed.     Follow up sooner if you are having increased amounts of blood, dizziness, fever, or other concerning signs.

## 2023-02-10 ENCOUNTER — OFFICE VISIT (OUTPATIENT)
Dept: FAMILY MEDICINE | Facility: OTHER | Age: 38
End: 2023-02-10
Attending: PHYSICIAN ASSISTANT
Payer: COMMERCIAL

## 2023-02-10 VITALS
RESPIRATION RATE: 20 BRPM | TEMPERATURE: 96.9 F | HEART RATE: 94 BPM | DIASTOLIC BLOOD PRESSURE: 74 MMHG | SYSTOLIC BLOOD PRESSURE: 114 MMHG | OXYGEN SATURATION: 97 %

## 2023-02-10 DIAGNOSIS — K92.1 BLOOD IN STOOL: Primary | ICD-10-CM

## 2023-02-10 PROCEDURE — 99214 OFFICE O/P EST MOD 30 MIN: CPT | Performed by: PHYSICIAN ASSISTANT

## 2023-02-10 ASSESSMENT — PAIN SCALES - GENERAL: PAINLEVEL: MILD PAIN (2)

## 2023-02-10 ASSESSMENT — ANXIETY QUESTIONNAIRES
2. NOT BEING ABLE TO STOP OR CONTROL WORRYING: NOT AT ALL
1. FEELING NERVOUS, ANXIOUS, OR ON EDGE: NOT AT ALL
7. FEELING AFRAID AS IF SOMETHING AWFUL MIGHT HAPPEN: NOT AT ALL
GAD7 TOTAL SCORE: 0
5. BEING SO RESTLESS THAT IT IS HARD TO SIT STILL: NOT AT ALL
3. WORRYING TOO MUCH ABOUT DIFFERENT THINGS: NOT AT ALL
6. BECOMING EASILY ANNOYED OR IRRITABLE: NOT AT ALL
IF YOU CHECKED OFF ANY PROBLEMS ON THIS QUESTIONNAIRE, HOW DIFFICULT HAVE THESE PROBLEMS MADE IT FOR YOU TO DO YOUR WORK, TAKE CARE OF THINGS AT HOME, OR GET ALONG WITH OTHER PEOPLE: NOT DIFFICULT AT ALL
GAD7 TOTAL SCORE: 0

## 2023-02-10 ASSESSMENT — PATIENT HEALTH QUESTIONNAIRE - PHQ9
5. POOR APPETITE OR OVEREATING: NOT AT ALL
SUM OF ALL RESPONSES TO PHQ QUESTIONS 1-9: 0

## 2023-02-10 NOTE — NURSING NOTE
"Patient presents to the clinic for follow up with GI bleed.    FOOD SECURITY SCREENING QUESTIONS:    The next two questions are to help us understand your food security.  If you are feeling you need any assistance in this area, we have resources available to support you today.    Hunger Vital Signs:  Within the past 12 months we worried whether our food would run out before we got money to buy more. Never  Within the past 12 months the food we bought just didn't last and we didn't have money to get more. Never    Advance Care Directive on file? no  Advance Care Directive provided to patient? Declined.    Chief Complaint   Patient presents with     Clinic Care Coordination - Follow-up     RC       Initial /74 (BP Location: Right arm, Patient Position: Sitting, Cuff Size: Adult Large)   Pulse 94   Temp 96.9  F (36.1  C) (Tympanic)   Resp 20   SpO2 97%  Estimated body mass index is 32.14 kg/m  as calculated from the following:    Height as of 2/3/23: 1.702 m (5' 7\").    Weight as of 2/3/23: 93.1 kg (205 lb 3.2 oz).  Medication Reconciliation: complete        Jennifer Medel LPN       "

## 2023-02-10 NOTE — H&P (VIEW-ONLY)
Assessment & Plan     1. Blood in stool  - CBC and Differential; Future  - Adult GI  Referral - Procedure Only; Future  - Comprehensive Metabolic Panel; Future  - Iron & Iron Binding Capacity; Future  - Ferritin; Future  -Vital signs stable.  Reviewed recent lab work, enteric and bacterial stool panels are negative. CBC -blood cell count 7.7, red blood cell count of 5.12, hemoglobin of 16.1, hematocrit of 43.9, MCV of 86, MCH 31.4, MCHC of 36.7, RDW of 12.6 and platelets of 250.  Examination is unrevealing today.  We will refer to general surgery for colonoscopy for diagnostic purposes.  He understands he will be contacted to schedule.  I also placed future orders for CBC, CMP iron studies.  He will call to schedule lab only visit we will follow-up on results as it is available.  Strict return cautions reviewed. Patient is in agreement and understanding of the above treatment plan. All questions and concerns were addressed and answered to patient's satisfaction. AVS reviewed with patient.     Return for Referral placed, they will contact you to schedule.    Genevieve Leach PA-C  [facility] AND [facility]    Lucas Avery is a 37 year old, presenting for the following health issues:  Clinic Care Coordination - Follow-up (RC)      History of Present Illness       Reason for visit:  Digestive problems    He eats 2-3 servings of fruits and vegetables daily.He consumes 1 sweetened beverage(s) daily.He exercises with enough effort to increase his heart rate 60 or more minutes per day.  He exercises with enough effort to increase his heart rate 5 days per week.   He is taking medications regularly.  Patient presents today in follow-up from recent [facility] clinic visit on 2/3/2023.  He was seen for blood in stool.  CBC returned normal.  Viral and enteric bacterial stool studies were negative.     He states as of today his symptoms including blood in the stool and on toilet as well as while wiping have  been ongoing for approximately 1 week.  He states that his bowel movements are more frequent but looser and smaller in size.  He also reports that blood is not mixed in the stool which is new since his last visit.  He states that he has diffuse abdominal pain which is relieved with defecation.  He states in the last week he has not had a solid bowel movement.  He denies any recent travel, undercooked meat/unwashed fruits or vegetables, fevers or chills.  No genitourinary symptoms. No history of hemorrhoids or fissures. No personal or family history of IBS/IBD. No known family history of colon cancer. No weight loss or gain. No hair loss/gain. No changes in skin coloration (pallor).     Tobacco use (smokeless, vaping or cigarettes) - less than 1 cigarette weekly. Rare alcohol use, one every few months.     No prior abdominal surgeries (gallbladder, appendix, etc.). No known history of renal or hepatic disease.     No recent antibiotics.     No prior colonoscopies or stool testing (cologuard).     He does have a history of an ulcer (believes gastric) at age 19 (he is unsure how this was diagnosed, but did not have an EGD or colonoscopy at this time). He was placed on a medication for the ulcer - he is unsure of name.     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     Denies shortness of breath, chest pain. Denies changes to vision or hearing.     Chronic medications include: Indomethacin, Adderral and Gabapentin (back pain). His PCP is still in [place].     Review of Systems   Constitutional, HEENT, cardiovascular, pulmonary, GI, , musculoskeletal, neuro, skin, endocrine and psych systems are negative, except as otherwise noted.      Objective    /74 (BP Location: Right arm, Patient Position: Sitting, Cuff Size: Adult Large)   Pulse 94   Temp 96.9  F (36.1  C) (Tympanic)   Resp 20   SpO2 97%   There is no height or weight on  file to calculate BMI.  Physical Exam   GENERAL: healthy, alert and no distress  HENT: nose and mouth without ulcers or lesions, oropharynx clear and oral mucous membranes moist  RESP: lungs clear to auscultation - no rales, rhonchi or wheezes  CV: regular rate and rhythm, normal S1 S2, no S3 or S4, no murmur, click or rub, no peripheral edema and peripheral pulses strong  ABDOMEN: soft, nontender, no hepatosplenomegaly, no masses and bowel sounds normal  MS: no gross musculoskeletal defects noted, no edema  SKIN: no suspicious lesions or rashes  BACK: no CVA tenderness, no paralumbar tenderness  PSYCH: mentation appears normal and anxious  LYMPH: normal ant/post cervical, supraclavicular nodes    Lab work - ordered as future labs, reports nausea and anxiety today, declines to have drawn today.

## 2023-02-10 NOTE — PROGRESS NOTES
Assessment & Plan     1. Blood in stool  - CBC and Differential; Future  - Adult GI  Referral - Procedure Only; Future  - Comprehensive Metabolic Panel; Future  - Iron & Iron Binding Capacity; Future  - Ferritin; Future  -Vital signs stable.  Reviewed recent lab work, enteric and bacterial stool panels are negative. CBC -blood cell count 7.7, red blood cell count of 5.12, hemoglobin of 16.1, hematocrit of 43.9, MCV of 86, MCH 31.4, MCHC of 36.7, RDW of 12.6 and platelets of 250.  Examination is unrevealing today.  We will refer to general surgery for colonoscopy for diagnostic purposes.  He understands he will be contacted to schedule.  I also placed future orders for CBC, CMP iron studies.  He will call to schedule lab only visit we will follow-up on results as it is available.  Strict return cautions reviewed. Patient is in agreement and understanding of the above treatment plan. All questions and concerns were addressed and answered to patient's satisfaction. AVS reviewed with patient.     Return for Referral placed, they will contact you to schedule.    Genevieve Leach PA-C  [facility] AND [facility]    Lucas Avery is a 37 year old, presenting for the following health issues:  Clinic Care Coordination - Follow-up (RC)      History of Present Illness       Reason for visit:  Digestive problems    He eats 2-3 servings of fruits and vegetables daily.He consumes 1 sweetened beverage(s) daily.He exercises with enough effort to increase his heart rate 60 or more minutes per day.  He exercises with enough effort to increase his heart rate 5 days per week.   He is taking medications regularly.  Patient presents today in follow-up from recent [facility] clinic visit on 2/3/2023.  He was seen for blood in stool.  CBC returned normal.  Viral and enteric bacterial stool studies were negative.     He states as of today his symptoms including blood in the stool and on toilet as well as while wiping have  been ongoing for approximately 1 week.  He states that his bowel movements are more frequent but looser and smaller in size.  He also reports that blood is not mixed in the stool which is new since his last visit.  He states that he has diffuse abdominal pain which is relieved with defecation.  He states in the last week he has not had a solid bowel movement.  He denies any recent travel, undercooked meat/unwashed fruits or vegetables, fevers or chills.  No genitourinary symptoms. No history of hemorrhoids or fissures. No personal or family history of IBS/IBD. No known family history of colon cancer. No weight loss or gain. No hair loss/gain. No changes in skin coloration (pallor).     Tobacco use (smokeless, vaping or cigarettes) - less than 1 cigarette weekly. Rare alcohol use, one every few months.     No prior abdominal surgeries (gallbladder, appendix, etc.). No known history of renal or hepatic disease.     No recent antibiotics.     No prior colonoscopies or stool testing (cologuard).     He does have a history of an ulcer (believes gastric) at age 19 (he is unsure how this was diagnosed, but did not have an EGD or colonoscopy at this time). He was placed on a medication for the ulcer - he is unsure of name.     He does report increased stress, moved to the area from [place] a few months past, new sector/division at his job.     Denies shortness of breath, chest pain. Denies changes to vision or hearing.     Chronic medications include: Indomethacin, Adderral and Gabapentin (back pain). His PCP is still in [place].     Review of Systems   Constitutional, HEENT, cardiovascular, pulmonary, GI, , musculoskeletal, neuro, skin, endocrine and psych systems are negative, except as otherwise noted.      Objective    /74 (BP Location: Right arm, Patient Position: Sitting, Cuff Size: Adult Large)   Pulse 94   Temp 96.9  F (36.1  C) (Tympanic)   Resp 20   SpO2 97%   There is no height or weight on  file to calculate BMI.  Physical Exam   GENERAL: healthy, alert and no distress  HENT: nose and mouth without ulcers or lesions, oropharynx clear and oral mucous membranes moist  RESP: lungs clear to auscultation - no rales, rhonchi or wheezes  CV: regular rate and rhythm, normal S1 S2, no S3 or S4, no murmur, click or rub, no peripheral edema and peripheral pulses strong  ABDOMEN: soft, nontender, no hepatosplenomegaly, no masses and bowel sounds normal  MS: no gross musculoskeletal defects noted, no edema  SKIN: no suspicious lesions or rashes  BACK: no CVA tenderness, no paralumbar tenderness  PSYCH: mentation appears normal and anxious  LYMPH: normal ant/post cervical, supraclavicular nodes    Lab work - ordered as future labs, reports nausea and anxiety today, declines to have drawn today.

## 2023-02-10 NOTE — PATIENT INSTRUCTIONS
Colonoscopy ordered - they will call you to schedule    Blood work orders placed, you may call to schedule a lab only visit at [place] or [facility]

## 2023-02-16 DIAGNOSIS — Z12.11 ENCOUNTER FOR SCREENING COLONOSCOPY: Primary | ICD-10-CM

## 2023-02-17 RX ORDER — BISACODYL 5 MG/1
TABLET, DELAYED RELEASE ORAL
Qty: 2 TABLET | Refills: 0 | Status: ON HOLD | OUTPATIENT
Start: 2023-02-17 | End: 2023-02-21

## 2023-02-17 RX ORDER — POLYETHYLENE GLYCOL 3350, SODIUM CHLORIDE, SODIUM BICARBONATE, POTASSIUM CHLORIDE 420; 11.2; 5.72; 1.48 G/4L; G/4L; G/4L; G/4L
4000 POWDER, FOR SOLUTION ORAL ONCE
Qty: 4000 ML | Refills: 0 | Status: SHIPPED | OUTPATIENT
Start: 2023-02-17 | End: 2023-02-17

## 2023-02-21 ENCOUNTER — ANESTHESIA (OUTPATIENT)
Dept: SURGERY | Facility: OTHER | Age: 38
End: 2023-02-21
Payer: COMMERCIAL

## 2023-02-21 ENCOUNTER — HOSPITAL ENCOUNTER (OUTPATIENT)
Facility: OTHER | Age: 38
Discharge: HOME | End: 2023-02-21
Attending: SURGERY | Admitting: SURGERY
Payer: COMMERCIAL

## 2023-02-21 ENCOUNTER — ANESTHESIA EVENT (OUTPATIENT)
Dept: SURGERY | Facility: OTHER | Age: 38
End: 2023-02-21
Payer: COMMERCIAL

## 2023-02-21 VITALS
OXYGEN SATURATION: 98 % | TEMPERATURE: 97 F | BODY MASS INDEX: 32.11 KG/M2 | RESPIRATION RATE: 16 BRPM | SYSTOLIC BLOOD PRESSURE: 124 MMHG | DIASTOLIC BLOOD PRESSURE: 83 MMHG | HEART RATE: 60 BPM | WEIGHT: 205 LBS

## 2023-02-21 PROCEDURE — 45380 COLONOSCOPY AND BIOPSY: CPT | Performed by: SURGERY

## 2023-02-21 PROCEDURE — 45380 COLONOSCOPY AND BIOPSY: CPT | Performed by: NURSE ANESTHETIST, CERTIFIED REGISTERED

## 2023-02-21 PROCEDURE — 250N000009 HC RX 250: Performed by: NURSE ANESTHETIST, CERTIFIED REGISTERED

## 2023-02-21 PROCEDURE — 999N000010 HC STATISTIC ANES STAT CODE-CRNA PER MINUTE: Performed by: SURGERY

## 2023-02-21 PROCEDURE — 88305 TISSUE EXAM BY PATHOLOGIST: CPT

## 2023-02-21 PROCEDURE — 258N000003 HC RX IP 258 OP 636: Performed by: SURGERY

## 2023-02-21 PROCEDURE — 250N000011 HC RX IP 250 OP 636: Performed by: NURSE ANESTHETIST, CERTIFIED REGISTERED

## 2023-02-21 RX ORDER — PROPOFOL 10 MG/ML
INJECTION, EMULSION INTRAVENOUS CONTINUOUS PRN
Status: DISCONTINUED | OUTPATIENT
Start: 2023-02-21 | End: 2023-02-21

## 2023-02-21 RX ORDER — NALOXONE HYDROCHLORIDE 0.4 MG/ML
0.4 INJECTION, SOLUTION INTRAMUSCULAR; INTRAVENOUS; SUBCUTANEOUS
Status: DISCONTINUED | OUTPATIENT
Start: 2023-02-21 | End: 2023-02-21 | Stop reason: HOSPADM

## 2023-02-21 RX ORDER — FLUMAZENIL 0.1 MG/ML
0.2 INJECTION, SOLUTION INTRAVENOUS
Status: DISCONTINUED | OUTPATIENT
Start: 2023-02-21 | End: 2023-02-21 | Stop reason: HOSPADM

## 2023-02-21 RX ORDER — LIDOCAINE 40 MG/G
CREAM TOPICAL
Status: DISCONTINUED | OUTPATIENT
Start: 2023-02-21 | End: 2023-02-21 | Stop reason: HOSPADM

## 2023-02-21 RX ORDER — NALOXONE HYDROCHLORIDE 0.4 MG/ML
0.2 INJECTION, SOLUTION INTRAMUSCULAR; INTRAVENOUS; SUBCUTANEOUS
Status: DISCONTINUED | OUTPATIENT
Start: 2023-02-21 | End: 2023-02-21 | Stop reason: HOSPADM

## 2023-02-21 RX ORDER — SODIUM CHLORIDE, SODIUM LACTATE, POTASSIUM CHLORIDE, CALCIUM CHLORIDE 600; 310; 30; 20 MG/100ML; MG/100ML; MG/100ML; MG/100ML
INJECTION, SOLUTION INTRAVENOUS CONTINUOUS
Status: DISCONTINUED | OUTPATIENT
Start: 2023-02-21 | End: 2023-02-21 | Stop reason: HOSPADM

## 2023-02-21 RX ORDER — LIDOCAINE HYDROCHLORIDE 20 MG/ML
INJECTION, SOLUTION INFILTRATION; PERINEURAL PRN
Status: DISCONTINUED | OUTPATIENT
Start: 2023-02-21 | End: 2023-02-21

## 2023-02-21 RX ORDER — PROPOFOL 10 MG/ML
INJECTION, EMULSION INTRAVENOUS PRN
Status: DISCONTINUED | OUTPATIENT
Start: 2023-02-21 | End: 2023-02-21

## 2023-02-21 RX ADMIN — LIDOCAINE HYDROCHLORIDE 40 MG: 20 INJECTION, SOLUTION INFILTRATION; PERINEURAL at 08:24

## 2023-02-21 RX ADMIN — PROPOFOL 100 MG: 10 INJECTION, EMULSION INTRAVENOUS at 08:24

## 2023-02-21 RX ADMIN — PROPOFOL 160 MCG/KG/MIN: 10 INJECTION, EMULSION INTRAVENOUS at 08:24

## 2023-02-21 RX ADMIN — SODIUM CHLORIDE, POTASSIUM CHLORIDE, SODIUM LACTATE AND CALCIUM CHLORIDE: 600; 310; 30; 20 INJECTION, SOLUTION INTRAVENOUS at 08:03

## 2023-02-21 ASSESSMENT — ACTIVITIES OF DAILY LIVING (ADL): ADLS_ACUITY_SCORE: 35

## 2023-02-21 NOTE — DISCHARGE INSTRUCTIONS
[facility]  Adult Discharge Orders & Instructions    ________________________________________________________________          For 12 hours after surgery  Get plenty of rest.  A responsible adult must stay with you for at least 12 hours after you leave the hospital.   You may feel lightheaded.  IF so, sit for a few minutes before standing.  Have someone help you get up.   You may have a slight fever. Call the doctor if your fever is over 101 F (38.3 C) (taken under the tongue) or lasts longer than 24 hours.  You may have a dry mouth, a sore throat, muscle aches or trouble sleeping.  These should go away after 24 hours.  Do not make important or legal decisions.  6.   Do not drive or use heavy equipment.  If you have weakness or tingling, don't drive or use heavy equipment until this feeling goes away.    To contact a doctor, call   [phone]_______________________

## 2023-02-21 NOTE — OR NURSING
Patient has been discharged to home at 1020 via ambulation to car accompanied by Juju.    Written discharge instructions were provided to patient.  Prescriptions were none.  Patient states their pain is 0/10, and denies any nausea or dizziness upon discharge.    Patient and adult caring for them verbalize understanding of discharge instructions including no driving until tomorrow and no longer taking narcotic pain medications - no operating mechanical equipment and no making any important decisions.They understand reason for discharge, and necessary follow-up appointments.

## 2023-02-21 NOTE — INTERVAL H&P NOTE
I saw and examined Bennie Sullivan.  I have reviewed the history and physical and find no changes to the patient's medical status or condition with the exceptions noted below.   Bennie Sullivan denies family history of colon cancer. Patient has persistent blood in stools for the past few weeks. Previous colonoscopy was never. Also complains of urgency to go, feeling of incomplete emptying and crampy abdominal pain and mucousy stools. Concern for inflammatory bowel disease.    The technical details of colonoscopy were discussed with the patient along with the risks and benefits to include bleeding, perforation and incomplete study. Bennie Sullivan demonstrated understanding and is willing to proceed.       Fermin Gamez MD   8:14 AM 2/21/2023

## 2023-02-21 NOTE — ANESTHESIA POSTPROCEDURE EVALUATION
Patient: Bennie Sullivan    Procedure: Procedure(s):  COLONOSCOPY, WITH BIOPSY       Anesthesia Type:  MAC    Note:  Disposition: Outpatient   Postop Pain Control: Uneventful            Sign Out: Well controlled pain   PONV: No   Neuro/Psych: Uneventful            Sign Out: Acceptable/Baseline neuro status   Airway/Respiratory: Uneventful            Sign Out: Acceptable/Baseline resp. status   CV/Hemodynamics: Uneventful            Sign Out: Acceptable CV status; No obvious hypovolemia; No obvious fluid overload   Other NRE: NONE   DID A NON-ROUTINE EVENT OCCUR?            Last vitals:  Vitals Value Taken Time   /83 02/21/23 0915   Temp 97  F (36.1  C) 02/21/23 0854   Pulse 60 02/21/23 0915   Resp 16 02/21/23 0854   SpO2 98 % 02/21/23 0929   Vitals shown include unvalidated device data.    Electronically Signed By: DEREK CARRANZA CRNA  February 21, 2023  9:51 AM

## 2023-02-21 NOTE — ANESTHESIA PREPROCEDURE EVALUATION
Anesthesia Pre-Procedure Evaluation    Patient: Bennie Sullivan   MRN: [id number] : 1985        Procedure : Procedure(s):  COLONOSCOPY          Past Medical History:   Diagnosis Date     Other fracture of unspecified lower leg, initial encounter for closed fracture     age 20      Past Surgical History:   Procedure Laterality Date     OTHER SURGICAL HISTORY      [id number],OTHER,Age 20      Allergies   Allergen Reactions     Sulfamethoxazole-Trimethoprim Other (See Comments)     Childhood allergy      Social History     Tobacco Use     Smoking status: Some Days     Packs/day: 0.01     Types: Cigarettes     Last attempt to quit: 2013     Years since quittin.3     Smokeless tobacco: Never   Substance Use Topics     Alcohol use: Yes     Comment: occasionally      Wt Readings from Last 1 Encounters:   23 93 kg (205 lb)        Anesthesia Evaluation   Pt has had prior anesthetic.         ROS/MED HX  ENT/Pulmonary:  - neg pulmonary ROS     Neurologic:  - neg neurologic ROS     Cardiovascular:  - neg cardiovascular ROS     METS/Exercise Tolerance: >4 METS    Hematologic:  - neg hematologic  ROS     Musculoskeletal:  - neg musculoskeletal ROS     GI/Hepatic: Comment: Occasional GERD      Renal/Genitourinary:  - neg Renal ROS     Endo:  - neg endo ROS     Psychiatric/Substance Use: Comment: ADHD    (+) psychiatric history other (comment)     Infectious Disease:  - neg infectious disease ROS     Malignancy:  - neg malignancy ROS     Other:  - neg other ROS          Physical Exam    Airway        Mallampati: I   TM distance: > 3 FB   Neck ROM: full   Mouth opening: > 3 cm    Respiratory Devices and Support         Dental       (+) Completely normal teeth      Cardiovascular   cardiovascular exam normal          Pulmonary   pulmonary exam normal                OUTSIDE LABS:  CBC:   Lab Results   Component Value Date    WBC 7.7 2023    HGB 16.1 2023    HGB 17.3 2014    HCT 43.9 2023     HCT 47.8 11/05/2014     02/03/2023     11/05/2014     BMP:   Lab Results   Component Value Date     11/05/2014    POTASSIUM 3.6 11/05/2014    CHLORIDE 103 11/05/2014    CO2 31 11/05/2014    BUN 20 11/05/2014    CR 1.39 (H) 11/05/2014    GLC 82 11/05/2014     COAGS: No results found for: PTT, INR, FIBR  POC: No results found for: BGM, HCG, HCGS  HEPATIC:   Lab Results   Component Value Date    ALBUMIN 4.9 11/05/2014    PROTTOTAL 7.5 11/05/2014    ALKPHOS 58 11/05/2014    BILITOTAL 0.5 11/05/2014     OTHER:   Lab Results   Component Value Date    DAO 9.8 11/05/2014       Anesthesia Plan    ASA Status:  1   NPO Status:  NPO Appropriate    Anesthesia Type: MAC.     - Reason for MAC: straight local not clinically adequate              Consents    Anesthesia Plan(s) and associated risks, benefits, and realistic alternatives discussed. Questions answered and patient/representative(s) expressed understanding.     - Discussed: Risks, Benefits and Alternatives for BOTH SEDATION and the PROCEDURE were discussed     - Discussed with:  Patient         Postoperative Care            Comments:                DEREK CARRANZA CRNA

## 2023-02-21 NOTE — ANESTHESIA CARE TRANSFER NOTE
Patient: Bennie Sullivan    Procedure: Procedure(s):  COLONOSCOPY, WITH BIOPSY       Diagnosis: Blood in stool [K92.1]  Diagnosis Additional Information: No value filed.    Anesthesia Type:   MAC     Note:    Oropharynx: oropharynx clear of all foreign objects  Level of Consciousness: awake  Oxygen Supplementation: room air    Independent Airway: airway patency satisfactory and stable    Vital Signs Stable: post-procedure vital signs reviewed and stable  Report to RN Given: handoff report given  Patient transferred to: Phase II    Handoff Report: Identifed the Patient, Identified the Reponsible Provider, Reviewed the pertinent medical history, Discussed the surgical course, Reviewed Intra-OP anesthesia mangement and issues during anesthesia, Set expectations for post-procedure period and Allowed opportunity for questions and acknowledgement of understanding      Vitals:  Vitals Value Taken Time   BP     Temp     Pulse     Resp     SpO2         Electronically Signed By: DEREK CARRANZA CRNA  February 21, 2023  8:55 AM

## 2023-02-21 NOTE — OP NOTE
COLONOSCOPY PROCEDURE NOTE    DATE OF SERVICE: 2/21/2023    SURGEON: OCTAVIA Gamez MD     PRE-OP DIAGNOSIS:    Rectal bleeding  Change in bowel habits    POST-OP DIAGNOSIS:    Proctitis     PROCEDURE:   Colonoscopy with random biopsies    ASSISTANT:  Circulator: Keara White RN  Scrub Person: Rosario Romano    ANESTHESIA:  MAC                            MACCRNA Independent: She Turner APRN CRNA    INDICATION FOR THE PROCEDURE: The patient is a 37 year old male with rectal bleeding and associated symptoms concerning for inflammatory bowel disease.     PROCEDURE: After adequate sedation, the patient was in the left lateral decubitus position.  Rectal exam was performed.  There was normal tone and no palpable masses.  The colonoscope was introduced into the rectum and advanced to the cecum with Mild difficulty.  The patient's prep was good.  The terminal cecum was reached. The terminal ileum was intubated and this appeared normal. Biopsies were taken from terminal ileum with cold forceps. The cecum, ascending, transverse, descending and sigmoid colon were significant for inflamed, friable mucosa in the rectum from 20cm to the anal verge. Biopsies were taken from the colon and rectum with cold forceps. The scope was retroflexed in the rectum.  The anorectal junction was unremarkable.  The scope was straightened and removed.  The patient tolerated the procedure well.     ESTIMATED BLOOD LOSS: none    COMPLICATIONS:  None    TISSUE REMOVED:  Yes    RECOMMEND:    Follow-up pending pathology        OCTAVIA Gamez MD

## 2023-02-24 DIAGNOSIS — K51.211 ULCERATIVE PROCTITIS WITH RECTAL BLEEDING (H): Primary | ICD-10-CM

## 2023-02-24 LAB
PATH REPORT.COMMENTS IMP SPEC: NORMAL
PATH REPORT.FINAL DX SPEC: NORMAL
PATH REPORT.RELEVANT HX SPEC: NORMAL
PHOTO IMAGE: NORMAL

## 2024-10-04 ENCOUNTER — MEDICAL CORRESPONDENCE (OUTPATIENT)
Dept: MRI IMAGING | Facility: HOSPITAL | Age: 39
End: 2024-10-04

## 2024-10-18 ENCOUNTER — HOSPITAL ENCOUNTER (OUTPATIENT)
Dept: MRI IMAGING | Facility: HOSPITAL | Age: 39
Discharge: HOME | End: 2024-10-18
Attending: STUDENT IN AN ORGANIZED HEALTH CARE EDUCATION/TRAINING PROGRAM | Admitting: STUDENT IN AN ORGANIZED HEALTH CARE EDUCATION/TRAINING PROGRAM
Payer: COMMERCIAL

## 2024-10-18 DIAGNOSIS — M54.16 LUMBAR RADICULOPATHY: ICD-10-CM

## 2024-10-18 PROCEDURE — 72148 MRI LUMBAR SPINE W/O DYE: CPT

## (undated) DEVICE — TUBING SUCTION 10'X3/16" N510

## (undated) DEVICE — SUCTION MANIFOLD NEPTUNE 2 SYS 4 PORT 0702-020-000

## (undated) DEVICE — ENDO FORCEP ENDOJAW BIOPSY 2.8MMX230CM FB-220U

## (undated) DEVICE — ENDO BRUSH CHANNEL MASTER CLEANING 2-4.2MM BW-412T

## (undated) DEVICE — SOL WATER 1500ML

## (undated) DEVICE — ENDO KIT COMPLIANCE DYKENDOCMPLY

## (undated) RX ORDER — LIDOCAINE HYDROCHLORIDE 10 MG/ML
INJECTION, SOLUTION INFILTRATION; PERINEURAL
Status: DISPENSED
Start: 2020-08-17

## (undated) RX ORDER — METHYLPREDNISOLONE ACETATE 80 MG/ML
INJECTION, SUSPENSION INTRA-ARTICULAR; INTRALESIONAL; INTRAMUSCULAR; SOFT TISSUE
Status: DISPENSED
Start: 2020-08-17

## (undated) RX ORDER — LIDOCAINE HYDROCHLORIDE 10 MG/ML
INJECTION, SOLUTION EPIDURAL; INFILTRATION; INTRACAUDAL; PERINEURAL
Status: DISPENSED
Start: 2020-08-17